# Patient Record
Sex: FEMALE | Race: WHITE | NOT HISPANIC OR LATINO | Employment: STUDENT | URBAN - METROPOLITAN AREA
[De-identification: names, ages, dates, MRNs, and addresses within clinical notes are randomized per-mention and may not be internally consistent; named-entity substitution may affect disease eponyms.]

---
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
## 2023-01-13 NOTE — MR AVS SNAPSHOT
After Visit Summary   8/10/2018    Tamara Hutson    MRN: [id number]           Patient Information     Date Of Birth          2005        Visit Information        Provider Department      8/10/2018 10:40 AM Miladis Daniel MD [facility]        Today's Diagnoses     Fatigue, unspecified type    -  1      Care Instructions    Normal exam.  Will contact you with blood results.          Follow-ups after your visit        Follow-up notes from your care team     Return if symptoms worsen or fail to improve.      Who to contact     If you have questions or need follow up information about today's clinic visit or your schedule please contact [facility] directly at [phone].  Normal or non-critical lab and imaging results will be communicated to you by MyChart, letter or phone within 4 business days after the clinic has received the results. If you do not hear from us within 7 days, please contact the clinic through [employer] or phone. If you have a critical or abnormal lab result, we will notify you by phone as soon as possible.  Submit refill requests through [employer] or call your pharmacy and they will forward the refill request to us. Please allow 3 business days for your refill to be completed.          Additional Information About Your Visit        MyChart Information     [employer] gives you secure access to your electronic health record. If you see a primary care provider, you can also send messages to your care team and make appointments. If you have questions, please call your primary care clinic.  If you do not have a primary care provider, please call [phone] and they will assist you.        Care EveryWhere ID     This is your Care EveryWhere ID. This could be used by other organizations to access your [place] medical records  [id number]        Your Vitals Were     Pulse Temperature Height Last Period BMI (Body Mass Index)        "77 98.7  F (37.1  C) (Oral) 5' 9.69\" (1.77 m) 07/29/2018 (Exact Date) 22.09 kg/m2        Blood Pressure from Last 3 Encounters:   08/10/18 118/78   05/01/18 115/65   07/26/17 115/70    Weight from Last 3 Encounters:   08/10/18 152 lb 9.6 oz (69.2 kg) (90 %)*   05/01/18 151 lb (68.5 kg) (90 %)*   07/26/17 137 lb 12.8 oz (62.5 kg) (85 %)*     * Growth percentiles are based on [facility] 2-20 Years data.              We Performed the Following     CBC with platelets and differential     EBV Capsid Antibody IgG     EBV Capsid Antibody IgM     Ferritin     Lyme Disease Ana with reflex to WB Serum     T4, free     TSH        Primary Care Provider Office Phone # Fax #    Tisha Barnett [phone] [address] [facility] [address]        Equal Access to Services     EVA [place] : Hadii aad ku hadasho Soomaali, waaxda luqadaha, qaybta kaalmada adeegyada, jimi davis . So [facility] [phone].    ATENCIÓN: Si habla español, tiene a berumen disposición servicios gratuitos de asistencia lingüística. Llame al [phone].    We comply with applicable federal civil rights laws and [place] laws. We do not discriminate on the basis of race, color, national origin, age, disability, sex, sexual orientation, or gender identity.            Thank you!     Thank you for choosing [facility]  for your care. Our goal is always to provide you with excellent care. Hearing back from our patients is one way we can continue to improve our services. Please take a few minutes to complete the written survey that you may receive in the mail after your visit with us. Thank you!             Your Updated Medication List - Protect others around you: Learn how to safely use, store and throw away your medicines at www.disposemymeds.org.      Notice  As of 8/10/2018  1:11 PM    You have not been prescribed any medications.      " Fall with Harm Risk

## 2023-11-25 ENCOUNTER — HEALTH MAINTENANCE LETTER (OUTPATIENT)
Age: 18
End: 2023-11-25

## 2025-01-04 ENCOUNTER — HEALTH MAINTENANCE LETTER (OUTPATIENT)
Age: 20
End: 2025-01-04